# Patient Record
Sex: FEMALE | HISPANIC OR LATINO
[De-identification: names, ages, dates, MRNs, and addresses within clinical notes are randomized per-mention and may not be internally consistent; named-entity substitution may affect disease eponyms.]

---

## 2019-10-04 ENCOUNTER — RESULT REVIEW (OUTPATIENT)
Age: 34
End: 2019-10-04

## 2021-02-18 PROBLEM — Z00.00 ENCOUNTER FOR PREVENTIVE HEALTH EXAMINATION: Status: ACTIVE | Noted: 2021-02-18

## 2021-03-04 PROBLEM — N64.9 DISORDER OF BREAST, UNSPECIFIED: Status: ACTIVE | Noted: 2021-03-04

## 2021-03-04 PROBLEM — Z78.9 NON-SMOKER: Status: ACTIVE | Noted: 2021-03-04

## 2021-03-04 PROBLEM — Z72.89 ALCOHOL USE: Status: ACTIVE | Noted: 2021-03-04

## 2021-03-10 ENCOUNTER — APPOINTMENT (OUTPATIENT)
Dept: BREAST CENTER | Facility: CLINIC | Age: 36
End: 2021-03-10
Payer: COMMERCIAL

## 2021-03-10 VITALS
BODY MASS INDEX: 27.38 KG/M2 | HEART RATE: 79 BPM | HEIGHT: 61 IN | WEIGHT: 145 LBS | SYSTOLIC BLOOD PRESSURE: 105 MMHG | DIASTOLIC BLOOD PRESSURE: 73 MMHG

## 2021-03-10 DIAGNOSIS — Z72.89 OTHER PROBLEMS RELATED TO LIFESTYLE: ICD-10-CM

## 2021-03-10 DIAGNOSIS — Z86.000 PERSONAL HISTORY OF IN-SITU NEOPLASM OF BREAST: ICD-10-CM

## 2021-03-10 DIAGNOSIS — Z85.3 PERSONAL HISTORY OF MALIGNANT NEOPLASM OF BREAST: ICD-10-CM

## 2021-03-10 DIAGNOSIS — Z78.9 OTHER SPECIFIED HEALTH STATUS: ICD-10-CM

## 2021-03-10 DIAGNOSIS — N64.9 DISORDER OF BREAST, UNSPECIFIED: ICD-10-CM

## 2021-03-10 PROCEDURE — 99213 OFFICE O/P EST LOW 20 MIN: CPT

## 2021-03-10 PROCEDURE — 99072 ADDL SUPL MATRL&STAF TM PHE: CPT

## 2021-04-24 ENCOUNTER — TRANSCRIPTION ENCOUNTER (OUTPATIENT)
Age: 36
End: 2021-04-24

## 2021-04-26 ENCOUNTER — RESULT REVIEW (OUTPATIENT)
Age: 36
End: 2021-04-26

## 2021-04-29 ENCOUNTER — NON-APPOINTMENT (OUTPATIENT)
Age: 36
End: 2021-04-29

## 2022-07-20 ENCOUNTER — NON-APPOINTMENT (OUTPATIENT)
Age: 37
End: 2022-07-20

## 2022-07-21 ENCOUNTER — NON-APPOINTMENT (OUTPATIENT)
Age: 37
End: 2022-07-21

## 2022-08-31 ENCOUNTER — NON-APPOINTMENT (OUTPATIENT)
Age: 37
End: 2022-08-31

## 2022-08-31 DIAGNOSIS — R92.8 OTHER ABNORMAL AND INCONCLUSIVE FINDINGS ON DIAGNOSTIC IMAGING OF BREAST: ICD-10-CM

## 2022-12-15 ENCOUNTER — APPOINTMENT (OUTPATIENT)
Dept: BREAST CENTER | Facility: CLINIC | Age: 37
End: 2022-12-15

## 2022-12-15 VITALS
HEART RATE: 71 BPM | SYSTOLIC BLOOD PRESSURE: 100 MMHG | BODY MASS INDEX: 27.75 KG/M2 | WEIGHT: 147 LBS | HEIGHT: 61 IN | DIASTOLIC BLOOD PRESSURE: 66 MMHG

## 2022-12-15 DIAGNOSIS — R92.2 INCONCLUSIVE MAMMOGRAM: ICD-10-CM

## 2022-12-15 DIAGNOSIS — Z85.3 PERSONAL HISTORY OF MALIGNANT NEOPLASM OF BREAST: ICD-10-CM

## 2022-12-15 PROCEDURE — 99214 OFFICE O/P EST MOD 30 MIN: CPT

## 2022-12-15 NOTE — HISTORY OF PRESENT ILLNESS
[FreeTextEntry1] : 38 yo female patient presents for breast cancer surveillance. 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 Lui). s/p RT, refused tamoxifen. Patient has FHx breast ca mother 57 and MGM 72. Patient is BRCA (-).  \par \par 12/09/2019- Bilateral Dx MG & US: Dense. Microcalcs in posterior lateral R breast, probably benign. R 3:00 5cmfn palpable 3 cm cyst. BIRADS 3. Follow up imaging in 6 months.\par 06/22/2020- R Dx MG & US: R 8:00 6-7cmfn 0.9 cm cluster of pleomorphic microcalcs. R 8:00 5cmfn 0.8 cm complicated cyst with microcalcs adjacent to another cyst with microcalcs. R stereo bx of calcs\par recommended. BIRADS 4\par 06/29/2020 R 7:00 Stereo Bx - DCIS, intermediate nuclear grade, with microcalcs. ADH with calcs, microcalcs span 0.8 cm. ER + >90%, [place] + >90%. "Bilobed/hourglass" shaped clip in place. Concordant.\par 8/20/20: R [place]: 0.2cm residual focus of DCIS, intermediate grade, negative surgical margins, ER+ (>90%), [place]+ (>90%)\par 02/19/21: Pipe MG & US: extremely dense, scattered stable benign calcs, US - 0.5cm simple cyst 6:00 4FN, 0.6cm simple cyst 7:00 6FN, 0.5cm circumscribed hypoechoic nodule 9:"00 5FN likely cyst w/ inspissated debris, 0.6cm simple cyst 11:00 4FN, 0.8cm simple cyst 12:00 3FN, L - 1.5cm simple cyst 3-4:00 2FN, 0.6cm simple cyst 5:00 3FN, 1.8cm bilobed simple cyst 9:00 3FN. BIRADS 2\par 12/15/21 ([facility]) B/L DX MG/US: extremely dense, R 9:00 5cmFN, 0.9cm stable nodule. ISABELA. BIRADS 2 \par 8/30/22 MRI- B/l multiple cysts. L stable 0.5cm oval mass 2:00. L 0.6cm ring enhancing mass 10:00 (rec MR bx). R ISABELA. BI-RADS 4. \par 9/16/22 L MR bx- Cancelled - L area of enhancement UIQ no longer seen-. Rec 6m f/u MRI.. \par 12/15/22 ([facility]) B/L MG/US:extremely dense, R stable postsurgical changes. b/l cysts. ISABELA. BIRADS 2 \par

## 2022-12-15 NOTE — PAST MEDICAL HISTORY
[Menarche Age ____] : age at menarche was [unfilled] [Total Preg ___] : G[unfilled] [Menstruating] : The patient is menstruating [Definite ___ (Date)] : the last menstrual period was [unfilled] [Regular Cycle Intervals] : have been regular [History of Hormone Replacement Treatment] : has no history of hormone replacement treatment [FreeTextEntry6] : no [FreeTextEntry7] : no [FreeTextEntry8] : n/a

## 2022-12-15 NOTE — PHYSICAL EXAM
[de-identified] : R Breast/Axilla/Supraclavicular Area: no evidence of recurrence\par L Breast/Axilla/Supraclavicular Area: no masses, discharge, or adenopathy\par

## 2023-09-22 ENCOUNTER — APPOINTMENT (OUTPATIENT)
Dept: BREAST CENTER | Facility: CLINIC | Age: 38
End: 2023-09-22

## 2023-12-21 ENCOUNTER — NON-APPOINTMENT (OUTPATIENT)
Age: 38
End: 2023-12-21

## 2023-12-21 ENCOUNTER — APPOINTMENT (OUTPATIENT)
Dept: BREAST CENTER | Facility: CLINIC | Age: 38
End: 2023-12-21
Payer: COMMERCIAL

## 2023-12-21 VITALS
BODY MASS INDEX: 27.75 KG/M2 | DIASTOLIC BLOOD PRESSURE: 68 MMHG | WEIGHT: 147 LBS | HEART RATE: 74 BPM | HEIGHT: 61 IN | SYSTOLIC BLOOD PRESSURE: 102 MMHG

## 2023-12-21 DIAGNOSIS — Z80.3 FAMILY HISTORY OF MALIGNANT NEOPLASM OF BREAST: ICD-10-CM

## 2023-12-21 DIAGNOSIS — Z08 ENCOUNTER FOR FOLLOW-UP EXAMINATION AFTER COMPLETED TREATMENT FOR MALIGNANT NEOPLASM: ICD-10-CM

## 2023-12-21 DIAGNOSIS — Z85.3 ENCOUNTER FOR FOLLOW-UP EXAMINATION AFTER COMPLETED TREATMENT FOR MALIGNANT NEOPLASM: ICD-10-CM

## 2023-12-21 PROCEDURE — 99213 OFFICE O/P EST LOW 20 MIN: CPT

## 2023-12-22 NOTE — HISTORY OF PRESENT ILLNESS
[FreeTextEntry1] : Patient is a 39yo female who presents for breast cancer surveillance. Hx of R [place] 8/20/20 for DCIS [place] (+) (Dr. Lui). S/p RT but refused tamoxifen. Patient has FHx breast ca mother (age 57), maternal aunt (age 45) and maternal grandmother (age 72). Patient is BRCA/9 panel negative (tested 2020). Patient denies palpable masses, skin changes, or nipple discharge bilaterally.    12/09/19: B/l MG & US- Dense. Microcalcs in posterior lateral R breast, probably benign. R 3:00 5cmfn palpable 3 cm cyst. BIRADS 3. Follow up imaging in 6 months. 6/22/20: R MG & US- R 8:00 6-7cmfn 0.9 cm cluster of pleomorphic microcalcs. R 8:00 5cmfn 0.8 cm complicated cyst with microcalcs adjacent to another cyst with microcalcs. R stereo bx of calcs recommended. BIRADS 4 06/29/20: R 7:00 Stereo Bx- DCIS, intermediate nuclear grade, with microcalcs. ADH with calcs, microcalcs span 0.8 cm. ER + >90%, TN + >90%. "Bilobed/hourglass" shaped clip in place. Concordant. 8/20/20: R [place]- 0.2cm residual focus of DCIS, intermediate grade, negative surgical margins, ER+ (>90%), [place]+ (>90%) 02/19/21: B/l MG & US- extremely dense, scattered stable benign calcs, US - 0.5cm simple cyst 6:00 4FN, 0.6cm simple cyst 7:00 6FN, 0.5cm circumscribed hypoechoic nodule 9:"00 5FN likely cyst w/ inspissated debris, 0.6cm simple cyst 11:00 4FN, 0.8cm simple cyst 12:00 3FN, L - 1.5cm simple cyst 3-4:00 2FN, 0.6cm simple cyst 5:00 3FN, 1.8cm bilobed simple cyst 9:00 3FN. BIRADS 2 12/15/21: B/l MG & US- extremely dense, R 9:00 5cmFN, 0.9cm stable nodule. ISABELA. BIRADS 2  8/30/22: MRI- B/l multiple cysts. L stable 0.5cm oval mass 2:00. L 0.6cm ring enhancing mass 10:00 (rec MR bx). R ISABELA. BI-RADS 4.  9/16/22: L MR bx- Cancelled - L area of enhancement UIQ no longer seen-. Rec 6m f/u MRI. 12/15/22: B/l MG & US- extremely dense, R stable postsurgical changes. b/l cysts. ISABELA. BIRADS 2  12/21/23: MRI- L increased enhancement likely secondary to hormonal changes. Rec 6m f/u. BI-RADS 3 12/21/23: B/l MG & US- extremely dense. L probably benign calcs outer central 6FN (rec 6m f/u). US- B/l fibrocystic changes. BI-RADS 3

## 2023-12-22 NOTE — PAST MEDICAL HISTORY
[History of Hormone Replacement Treatment] : has no history of hormone replacement treatment [FreeTextEntry6] : no [FreeTextEntry7] : no [FreeTextEntry8] : n/a

## 2023-12-22 NOTE — PHYSICAL EXAM
[de-identified] : R Breast/Axilla/Supraclavicular Area: no evidence of recurrence\par  L Breast/Axilla/Supraclavicular Area: no masses, discharge, or adenopathy\par   [Normocephalic] : normocephalic [EOMI] : extra ocular movement intact [Supple] : supple [No Supraclavicular Adenopathy] : no supraclavicular adenopathy [No Cervical Adenopathy] : no cervical adenopathy [de-identified] : R breast/axilla/supraclavicular area: No evidence of recurrence [de-identified] : L 2cm palpable mass 9:00 c/w simple cyst on in office US L breast/axilla/supraclavicular area: No discharge or adenopathy

## 2024-06-24 ENCOUNTER — NON-APPOINTMENT (OUTPATIENT)
Age: 39
End: 2024-06-24

## 2024-07-16 ENCOUNTER — RESULT REVIEW (OUTPATIENT)
Age: 39
End: 2024-07-16

## 2024-08-07 ENCOUNTER — NON-APPOINTMENT (OUTPATIENT)
Age: 39
End: 2024-08-07

## 2024-08-07 ENCOUNTER — RESULT REVIEW (OUTPATIENT)
Age: 39
End: 2024-08-07

## 2024-08-15 ENCOUNTER — NON-APPOINTMENT (OUTPATIENT)
Age: 39
End: 2024-08-15

## 2024-10-22 ENCOUNTER — NON-APPOINTMENT (OUTPATIENT)
Age: 39
End: 2024-10-22

## 2024-12-25 PROBLEM — F10.90 ALCOHOL USE: Status: ACTIVE | Noted: 2021-03-04

## 2025-01-02 ENCOUNTER — APPOINTMENT (OUTPATIENT)
Dept: BREAST CENTER | Facility: CLINIC | Age: 40
End: 2025-01-02
Payer: COMMERCIAL

## 2025-01-02 VITALS
WEIGHT: 146 LBS | DIASTOLIC BLOOD PRESSURE: 72 MMHG | HEART RATE: 81 BPM | BODY MASS INDEX: 27.56 KG/M2 | HEIGHT: 61 IN | SYSTOLIC BLOOD PRESSURE: 113 MMHG

## 2025-01-02 DIAGNOSIS — R92.30 DENSE BREASTS, UNSPECIFIED: ICD-10-CM

## 2025-01-02 DIAGNOSIS — Z85.3 PERSONAL HISTORY OF MALIGNANT NEOPLASM OF BREAST: ICD-10-CM

## 2025-01-02 PROCEDURE — 99214 OFFICE O/P EST MOD 30 MIN: CPT
